# Patient Record
Sex: FEMALE | Race: WHITE | NOT HISPANIC OR LATINO | Employment: FULL TIME | URBAN - METROPOLITAN AREA
[De-identification: names, ages, dates, MRNs, and addresses within clinical notes are randomized per-mention and may not be internally consistent; named-entity substitution may affect disease eponyms.]

---

## 2024-03-22 ENCOUNTER — HOSPITAL ENCOUNTER (EMERGENCY)
Facility: HOSPITAL | Age: 34
Discharge: HOME | End: 2024-03-22
Attending: STUDENT IN AN ORGANIZED HEALTH CARE EDUCATION/TRAINING PROGRAM

## 2024-03-22 ENCOUNTER — APPOINTMENT (OUTPATIENT)
Dept: RADIOLOGY | Facility: HOSPITAL | Age: 34
End: 2024-03-22

## 2024-03-22 ENCOUNTER — APPOINTMENT (OUTPATIENT)
Dept: CARDIOLOGY | Facility: HOSPITAL | Age: 34
End: 2024-03-22

## 2024-03-22 VITALS
HEART RATE: 84 BPM | WEIGHT: 155 LBS | DIASTOLIC BLOOD PRESSURE: 74 MMHG | HEIGHT: 63 IN | RESPIRATION RATE: 18 BRPM | OXYGEN SATURATION: 100 % | SYSTOLIC BLOOD PRESSURE: 134 MMHG | TEMPERATURE: 97.9 F | BODY MASS INDEX: 27.46 KG/M2

## 2024-03-22 DIAGNOSIS — R10.9 ABDOMINAL PAIN, UNSPECIFIED ABDOMINAL LOCATION: Primary | ICD-10-CM

## 2024-03-22 LAB
ALBUMIN SERPL BCP-MCNC: 4.2 G/DL (ref 3.4–5)
ALP SERPL-CCNC: 67 U/L (ref 33–110)
ALT SERPL W P-5'-P-CCNC: 28 U/L (ref 7–45)
ANION GAP SERPL CALC-SCNC: 14 MMOL/L (ref 10–20)
APPEARANCE UR: ABNORMAL
AST SERPL W P-5'-P-CCNC: 34 U/L (ref 9–39)
BASOPHILS # BLD AUTO: 0.03 X10*3/UL (ref 0–0.1)
BASOPHILS NFR BLD AUTO: 0.4 %
BILIRUB SERPL-MCNC: 0.5 MG/DL (ref 0–1.2)
BILIRUB UR STRIP.AUTO-MCNC: NEGATIVE MG/DL
BUN SERPL-MCNC: 8 MG/DL (ref 6–23)
CALCIUM SERPL-MCNC: 9.2 MG/DL (ref 8.6–10.3)
CARDIAC TROPONIN I PNL SERPL HS: <3 NG/L (ref 0–13)
CHLORIDE SERPL-SCNC: 106 MMOL/L (ref 98–107)
CO2 SERPL-SCNC: 21 MMOL/L (ref 21–32)
COLOR UR: YELLOW
CREAT SERPL-MCNC: 0.46 MG/DL (ref 0.5–1.05)
EGFRCR SERPLBLD CKD-EPI 2021: >90 ML/MIN/1.73M*2
EOSINOPHIL # BLD AUTO: 0.11 X10*3/UL (ref 0–0.7)
EOSINOPHIL NFR BLD AUTO: 1.5 %
ERYTHROCYTE [DISTWIDTH] IN BLOOD BY AUTOMATED COUNT: 14.1 % (ref 11.5–14.5)
GLUCOSE SERPL-MCNC: 122 MG/DL (ref 74–99)
GLUCOSE UR STRIP.AUTO-MCNC: NEGATIVE MG/DL
HCG UR QL IA.RAPID: NEGATIVE
HCT VFR BLD AUTO: 39.7 % (ref 36–46)
HGB BLD-MCNC: 12.7 G/DL (ref 12–16)
HOLD SPECIMEN: NORMAL
IMM GRANULOCYTES # BLD AUTO: 0.03 X10*3/UL (ref 0–0.7)
IMM GRANULOCYTES NFR BLD AUTO: 0.4 % (ref 0–0.9)
KETONES UR STRIP.AUTO-MCNC: NEGATIVE MG/DL
LEUKOCYTE ESTERASE UR QL STRIP.AUTO: NEGATIVE
LIPASE SERPL-CCNC: 15 U/L (ref 9–82)
LYMPHOCYTES # BLD AUTO: 2.34 X10*3/UL (ref 1.2–4.8)
LYMPHOCYTES NFR BLD AUTO: 31.2 %
MCH RBC QN AUTO: 24.2 PG (ref 26–34)
MCHC RBC AUTO-ENTMCNC: 32 G/DL (ref 32–36)
MCV RBC AUTO: 76 FL (ref 80–100)
MONOCYTES # BLD AUTO: 0.75 X10*3/UL (ref 0.1–1)
MONOCYTES NFR BLD AUTO: 10 %
NEUTROPHILS # BLD AUTO: 4.25 X10*3/UL (ref 1.2–7.7)
NEUTROPHILS NFR BLD AUTO: 56.5 %
NITRITE UR QL STRIP.AUTO: NEGATIVE
NRBC BLD-RTO: 0 /100 WBCS (ref 0–0)
PH UR STRIP.AUTO: 5 [PH]
PLATELET # BLD AUTO: 264 X10*3/UL (ref 150–450)
POTASSIUM SERPL-SCNC: 3.4 MMOL/L (ref 3.5–5.3)
PROT SERPL-MCNC: 7.4 G/DL (ref 6.4–8.2)
PROT UR STRIP.AUTO-MCNC: NEGATIVE MG/DL
RBC # BLD AUTO: 5.25 X10*6/UL (ref 4–5.2)
RBC # UR STRIP.AUTO: NEGATIVE /UL
SODIUM SERPL-SCNC: 138 MMOL/L (ref 136–145)
SP GR UR STRIP.AUTO: 1.03
UROBILINOGEN UR STRIP.AUTO-MCNC: 2 MG/DL
WBC # BLD AUTO: 7.5 X10*3/UL (ref 4.4–11.3)

## 2024-03-22 PROCEDURE — 74177 CT ABD & PELVIS W/CONTRAST: CPT

## 2024-03-22 PROCEDURE — 76705 ECHO EXAM OF ABDOMEN: CPT

## 2024-03-22 PROCEDURE — 81025 URINE PREGNANCY TEST: CPT | Performed by: NURSE PRACTITIONER

## 2024-03-22 PROCEDURE — 93005 ELECTROCARDIOGRAM TRACING: CPT

## 2024-03-22 PROCEDURE — 85025 COMPLETE CBC W/AUTO DIFF WBC: CPT | Performed by: NURSE PRACTITIONER

## 2024-03-22 PROCEDURE — 96374 THER/PROPH/DIAG INJ IV PUSH: CPT

## 2024-03-22 PROCEDURE — 99285 EMERGENCY DEPT VISIT HI MDM: CPT | Mod: 25

## 2024-03-22 PROCEDURE — 84075 ASSAY ALKALINE PHOSPHATASE: CPT | Performed by: NURSE PRACTITIONER

## 2024-03-22 PROCEDURE — 84484 ASSAY OF TROPONIN QUANT: CPT | Performed by: NURSE PRACTITIONER

## 2024-03-22 PROCEDURE — 96375 TX/PRO/DX INJ NEW DRUG ADDON: CPT

## 2024-03-22 PROCEDURE — 74177 CT ABD & PELVIS W/CONTRAST: CPT | Performed by: STUDENT IN AN ORGANIZED HEALTH CARE EDUCATION/TRAINING PROGRAM

## 2024-03-22 PROCEDURE — 83690 ASSAY OF LIPASE: CPT | Performed by: NURSE PRACTITIONER

## 2024-03-22 PROCEDURE — 2550000001 HC RX 255 CONTRASTS: Performed by: NURSE PRACTITIONER

## 2024-03-22 PROCEDURE — 36415 COLL VENOUS BLD VENIPUNCTURE: CPT | Performed by: NURSE PRACTITIONER

## 2024-03-22 PROCEDURE — 96361 HYDRATE IV INFUSION ADD-ON: CPT

## 2024-03-22 PROCEDURE — 81003 URINALYSIS AUTO W/O SCOPE: CPT | Performed by: NURSE PRACTITIONER

## 2024-03-22 PROCEDURE — 2500000004 HC RX 250 GENERAL PHARMACY W/ HCPCS (ALT 636 FOR OP/ED): Performed by: NURSE PRACTITIONER

## 2024-03-22 PROCEDURE — 76705 ECHO EXAM OF ABDOMEN: CPT | Performed by: STUDENT IN AN ORGANIZED HEALTH CARE EDUCATION/TRAINING PROGRAM

## 2024-03-22 RX ORDER — TRAMADOL HYDROCHLORIDE 50 MG/1
50 TABLET ORAL EVERY 6 HOURS PRN
Qty: 12 TABLET | Refills: 0 | Status: SHIPPED | OUTPATIENT
Start: 2024-03-22 | End: 2024-03-25

## 2024-03-22 RX ORDER — ONDANSETRON HYDROCHLORIDE 2 MG/ML
4 INJECTION, SOLUTION INTRAVENOUS ONCE
Status: COMPLETED | OUTPATIENT
Start: 2024-03-22 | End: 2024-03-22

## 2024-03-22 RX ORDER — SODIUM CHLORIDE 9 MG/ML
125 INJECTION, SOLUTION INTRAVENOUS CONTINUOUS
Status: DISCONTINUED | OUTPATIENT
Start: 2024-03-22 | End: 2024-03-22 | Stop reason: HOSPADM

## 2024-03-22 RX ORDER — KETOROLAC TROMETHAMINE 30 MG/ML
15 INJECTION, SOLUTION INTRAMUSCULAR; INTRAVENOUS ONCE
Status: COMPLETED | OUTPATIENT
Start: 2024-03-22 | End: 2024-03-22

## 2024-03-22 RX ADMIN — SODIUM CHLORIDE 125 ML/HR: 9 INJECTION, SOLUTION INTRAVENOUS at 12:57

## 2024-03-22 RX ADMIN — ONDANSETRON 4 MG: 2 INJECTION INTRAMUSCULAR; INTRAVENOUS at 10:21

## 2024-03-22 RX ADMIN — IOHEXOL 75 ML: 350 INJECTION, SOLUTION INTRAVENOUS at 13:23

## 2024-03-22 RX ADMIN — KETOROLAC TROMETHAMINE 15 MG: 30 INJECTION, SOLUTION INTRAMUSCULAR at 10:20

## 2024-03-22 RX ADMIN — SODIUM CHLORIDE 1000 ML: 9 INJECTION, SOLUTION INTRAVENOUS at 10:20

## 2024-03-22 ASSESSMENT — PAIN - FUNCTIONAL ASSESSMENT
PAIN_FUNCTIONAL_ASSESSMENT: 0-10
PAIN_FUNCTIONAL_ASSESSMENT: 0-10

## 2024-03-22 ASSESSMENT — PAIN DESCRIPTION - LOCATION: LOCATION: ABDOMEN

## 2024-03-22 ASSESSMENT — COLUMBIA-SUICIDE SEVERITY RATING SCALE - C-SSRS
1. IN THE PAST MONTH, HAVE YOU WISHED YOU WERE DEAD OR WISHED YOU COULD GO TO SLEEP AND NOT WAKE UP?: NO
2. HAVE YOU ACTUALLY HAD ANY THOUGHTS OF KILLING YOURSELF?: NO
6. HAVE YOU EVER DONE ANYTHING, STARTED TO DO ANYTHING, OR PREPARED TO DO ANYTHING TO END YOUR LIFE?: NO

## 2024-03-22 ASSESSMENT — LIFESTYLE VARIABLES
HAVE PEOPLE ANNOYED YOU BY CRITICIZING YOUR DRINKING: NO
HAVE YOU EVER FELT YOU SHOULD CUT DOWN ON YOUR DRINKING: NO
TOTAL SCORE: 0
EVER HAD A DRINK FIRST THING IN THE MORNING TO STEADY YOUR NERVES TO GET RID OF A HANGOVER: NO
EVER FELT BAD OR GUILTY ABOUT YOUR DRINKING: NO

## 2024-03-22 ASSESSMENT — PAIN DESCRIPTION - PAIN TYPE: TYPE: ACUTE PAIN

## 2024-03-22 ASSESSMENT — PAIN SCALES - GENERAL
PAINLEVEL_OUTOF10: 6
PAINLEVEL_OUTOF10: 5 - MODERATE PAIN

## 2024-03-22 ASSESSMENT — PAIN DESCRIPTION - ORIENTATION: ORIENTATION: MID

## 2024-03-22 ASSESSMENT — PAIN DESCRIPTION - PROGRESSION: CLINICAL_PROGRESSION: OTHER (COMMENT)

## 2024-03-22 NOTE — Clinical Note
Oz Zapien was seen and treated in our emergency department on 3/22/2024.  She may return to work on 03/25/2024.       If you have any questions or concerns, please don't hesitate to call.      Cathleen Zamudio, APRN-CNP

## 2024-03-22 NOTE — CONSULTS
"Reason For Consult  Appendiceal mucocele     History Of Present Illness  Oz Zapien is a 33 y.o. female with PMH significant for GERD who presented to [facility] ED 3/22 for abdominal pain. Patient reports bilateral upper quadrant abdominal pain has been going on for quite some time but has become more bothersome over the past 4-5 days. Denies associated nausea or vomiting. Denies bowel complaints.     In the ED, patient hemodynamically stable, afebrile. Labs unremarkable. RUQ US significant for hepatomegaly and steatosis. CT a/p significant for a probably appendiceal mucocele at the tip of the appendix. Surgery consulted for further recommendations.      Past Medical History  She has a past medical history of Other specified health status.    Surgical History  She has a past surgical history that includes Other surgical history (01/06/2015).     Social History  She reports that she has never smoked. She has never used smokeless tobacco. She reports that she does not currently use alcohol. She reports that she does not currently use drugs.    Family History  No family history on file.     Allergies  Patient has no known allergies.       Physical Exam  Gen: NAD  CV: RRR  Pulm: Comfortable work of breathing on RA  Abd: Soft, nondistended, no guarding, no rigidity  Ext: Acyanotic, peripheral pulses palpable  Neuro: Cranial nerves II-XII grossly intact, no obvious focal deficits. Gag reflex, taste and corneal reflex not tested  Psych: A&O x 3, mood appropriate  Skin: warm, dry, intact       Last Recorded Vitals  Blood pressure 134/74, pulse 84, temperature 36.6 °C (97.9 °F), temperature source Tympanic, resp. rate 18, height 1.6 m (5' 3\"), weight 70.3 kg (155 lb), SpO2 100 %.    Relevant Results  Results for orders placed or performed during the hospital encounter of 03/22/24 (from the past 24 hour(s))   CBC and Auto Differential   Result Value Ref Range    WBC 7.5 4.4 - 11.3 x10*3/uL    nRBC 0.0 0.0 - 0.0 /100 WBCs    " RBC 5.25 (H) 4.00 - 5.20 x10*6/uL    Hemoglobin 12.7 12.0 - 16.0 g/dL    Hematocrit 39.7 36.0 - 46.0 %    MCV 76 (L) 80 - 100 fL    MCH 24.2 (L) 26.0 - 34.0 pg    MCHC 32.0 32.0 - 36.0 g/dL    RDW 14.1 11.5 - 14.5 %    Platelets 264 150 - 450 x10*3/uL    Neutrophils % 56.5 40.0 - 80.0 %    Immature Granulocytes %, Automated 0.4 0.0 - 0.9 %    Lymphocytes % 31.2 13.0 - 44.0 %    Monocytes % 10.0 2.0 - 10.0 %    Eosinophils % 1.5 0.0 - 6.0 %    Basophils % 0.4 0.0 - 2.0 %    Neutrophils Absolute 4.25 1.20 - 7.70 x10*3/uL    Immature Granulocytes Absolute, Automated 0.03 0.00 - 0.70 x10*3/uL    Lymphocytes Absolute 2.34 1.20 - 4.80 x10*3/uL    Monocytes Absolute 0.75 0.10 - 1.00 x10*3/uL    Eosinophils Absolute 0.11 0.00 - 0.70 x10*3/uL    Basophils Absolute 0.03 0.00 - 0.10 x10*3/uL   Comprehensive metabolic panel   Result Value Ref Range    Glucose 122 (H) 74 - 99 mg/dL    Sodium 138 136 - 145 mmol/L    Potassium 3.4 (L) 3.5 - 5.3 mmol/L    Chloride 106 98 - 107 mmol/L    Bicarbonate 21 21 - 32 mmol/L    Anion Gap 14 10 - 20 mmol/L    Urea Nitrogen 8 6 - 23 mg/dL    Creatinine 0.46 (L) 0.50 - 1.05 mg/dL    eGFR >90 >60 mL/min/1.73m*2    Calcium 9.2 8.6 - 10.3 mg/dL    Albumin 4.2 3.4 - 5.0 g/dL    Alkaline Phosphatase 67 33 - 110 U/L    Total Protein 7.4 6.4 - 8.2 g/dL    AST 34 9 - 39 U/L    Bilirubin, Total 0.5 0.0 - 1.2 mg/dL    ALT 28 7 - 45 U/L   Lipase   Result Value Ref Range    Lipase 15 9 - 82 U/L   Troponin I, High Sensitivity   Result Value Ref Range    Troponin I, High Sensitivity <3 0 - 13 ng/L   Urinalysis with Reflex Culture and Microscopic   Result Value Ref Range    Color, Urine Yellow Straw, Yellow    Appearance, Urine Hazy (N) Clear    Specific Gravity, Urine 1.031 1.005 - 1.035    pH, Urine 5.0 5.0, 5.5, 6.0, 6.5, 7.0, 7.5, 8.0    Protein, Urine NEGATIVE NEGATIVE mg/dL    Glucose, Urine NEGATIVE NEGATIVE mg/dL    Blood, Urine NEGATIVE NEGATIVE    Ketones, Urine NEGATIVE NEGATIVE mg/dL     Bilirubin, Urine NEGATIVE NEGATIVE    Urobilinogen, Urine 2.0 (N) <2.0 mg/dL    Nitrite, Urine NEGATIVE NEGATIVE    Leukocyte Esterase, Urine NEGATIVE NEGATIVE   hCG, Urine, Qualitative   Result Value Ref Range    HCG, Urine NEGATIVE NEGATIVE     CT abdomen pelvis w IV contrast    Result Date: 3/22/2024  Interpreted By:  Alfredo Rivas, STUDY: CT ABDOMEN PELVIS W IV CONTRAST;  3/22/2024 1:23 pm   INDICATION: Upper abdominal pain x 4 to 5 days, radiating through to the back with nausea, fever and chills.   COMPARISON: None   ACCESSION NUMBER(S): [id number]   ORDERING CLINICIAN: SONIA MILLER   TECHNIQUE: Axial CT of the abdomen and pelvis was performed following intravenous administration of 75 ml of contrast Omnipaque 350 with coronal and sagittal reconstruction.   FINDINGS: Lower chest: No focal consolidation or pleural effusion.   Liver: No mass.   Biliary: No intrahepatic or extrahepatic bile duct dilation. No cholelithiasis.   Spleen: No mass.   Pancreas: No mass or duct dilation.   Adrenals: Normal.   Kidneys: No suspicious mass, calculus or hydronephrosis.   GI tract: No bowel wall thickening or dilation. The tip of the appendix is dilated up to 1.1 cm with curvilinear mural calcification (series 602, image 51). The remainder of the appendix is normal in caliber without periappendiceal inflammation.   Lymph nodes: No abdominopelvic lymphadenopathy.   Mesentery/peritoneum: No free fluid, free air or fluid collection.   Vasculature: No abdominal aortic aneurysm.   Pelvis: Trace physiologic free fluid in the posterior cul-de-sac. No free air or fluid collection. Age-appropriate anteverted uterus.   Bones/Soft tissues: No acute osseous or abdominal wall soft tissue abnormalities.       No renal calculus, hydronephrosis or other acute abdominopelvic process.   Suspect probable appendiceal mucocele at the tip of the appendix; consider surgical consultation. No acute appendicitis.   MACRO: Alfredo Rivas discussed  the significance and urgency of this critical finding by Epic secure chat with  SONIA MILLER on 3/22/2024 at 1:48 pm.  (**-RCF-**) Findings:  See findings.   Signed by: Alfredo Rivas 3/22/2024 1:48 PM Dictation workstation:   [id number]    US gallbladder    Result Date: 3/22/2024  Interpreted By:  Juan Conway, STUDY: US GALLBLADDER  3/22/2024 11:45 am   INDICATION: 34 y/o   F with  Signs/Symptoms:Upper abdominal pain, rule out gallbladder issue.   COMPARISON: None.   ACCESSION NUMBER(S): [id number]   ORDERING CLINICIAN: SONIA MILLER   TECHNIQUE: Routine ultrasound of the right upper quadrant was performed.  Static images were obtained for remote interpretation.   FINDINGS: LIVER: Craniocaudal length:  18.1 cm,  enlarged Echogenicity:  Increased Mass:  None.   BILE DUCTS: Intrahepatic ducts: Non-dilated. Common bile duct diameter: 4 mm.   GALLBLADDER: Gallbladder:  Normal. Gallstones:  None. Gallbladder sludge:  None. Gallbladder wall thickening:  None. Pericholecystic fluid:  None.   PANCREAS:   Visualized portions are unremarkable.   RIGHT KIDNEY: Craniocaudal length:  11.6 cm,  within normal limits of size for age. No hydronephrosis, hydroureter or focal renal lesion.   PERITONEAL FLUID:   None seen in the right upper quadrant.       Hepatomegaly and steatosis. No focal lesion. No cholelithiasis or ductal dilation.   Signed by: Juan Conway 3/22/2024 11:52 AM Dictation workstation:   [id number]        Assessment/Plan     Oz Zapien is a 33 y.o. female with PMH significant for GERD who presented to [facility] ED 3/22 for abdominal pain. RUQ US significant for hepatomegaly and steatosis. CT a/p significant for a probably appendiceal mucocele at the tip of the appendix. Surgery consulted for further recommendations.     Impression:  Appendiceal mucocele    Recommendations:  - patient will need to follow up with Dr. Domingo in the next 2 weeks to discuss elective lap appendectomy  - no need for  admission from surgical standpoint  - no need for homegoing antibiotics  - remainder of care per ED    Patient seen and discussed with Dr. Domingo    I spent 30 minutes in the professional and overall care of this patient.      Geri Angeles, PRAKASH-CNP

## 2024-03-22 NOTE — ED PROVIDER NOTES
Limitations to History: None     HPI:      Oz Zapien is a 33 y.o. female with significant past medical history for GERD on omeprazole and no abdominal surgeries presenting to ED today from home with  for evaluation of abdominal pain.  For the last 4 to 5 days the patient has had upper abdominal pain that is described as a sharp aching sensation that radiates through to the back.  Occasionally the patient feels a knifelike pain in the epigastric region.  Has had GI issues since she delivered her daughter 9 months ago, it was thought to be some irritable bowel syndrome.  Endorses nausea.  Has had nonbloody loose stools for some time up to 3 a day.  Reports fever and chills earlier in the week that have resolved.  Denies cough/cold symptoms, chest pain, shortness of breath, vomiting, dysuria/hematuria or any other complaints.  No smoking, EtOH or drug use.  N.p.o. since 6 AM.  PCP at [facility].    Additional History Obtained from: None    ------------------------------------------------------------------------------------------------------------------------------------------    VS: As documented in the triage note and EMR flowsheet from this visit were reviewed.    Physical Exam:  Gen:  33-year-old female appears uncomfortable but nontoxic.  Awake, alert and oriented x 3.  Pleasant and cooperative.  Well-nourished and hydrated.    Head and neck: Normocephalic and atraumatic.  No anterior cervical lymphadenopathy.  Full range of motion neck without difficulty.  Eyes: EOMI, PERRL, anicteric sclerae, noninjected conjunctivae  Ears: TMs clear b/l without sign of infection  Nose: Nares patent w/o rhinorrhea  Mouth:  MMM, no OP lesions noted  Heart: RRR no MRG  Lungs: CTA b/l no RRW, no increased work of breathing  Abdomen: Abdomen slightly rounded, tender in the epigastric and right upper quadrant.  Bowel sounds x 4.  No rebound or guarding.  No palpable organomegaly.  No CVA tenderness.  Musculoskeletal: Movement of  extremities x 4.  MSPs intact.  Skin intact.  No deformities.  Neurologic: Alert, symmetrical facies, phonates clearly, moves all extremities equally, responsive to touch, ambulates normally   Skin: Pink warm dry, no erythema, ecchymosis or edema.  No rashes noted  Psychological: calm, no SI/HI      ------------------------------------------------------------------------------------------------------------------------------------------    Medical Decision Making:     ED Course as of 03/22/24 1515   Fri Mar 22, 2024   1110 Laboratory studies were reviewed, no leukocytosis or evidence of anemia.  Normal kidney function, LFTs and lipase.  Normal high-sensitivity troponin.  Potassium mildly low at 3.4.  Urine, CT and ultrasound pending. [SB]   1400 Urine shows no evidence of infection.  Right upper quadrant ultrasound shows hepatomegaly and steatosis, gallbladder within normal limits.  CT abdomen/pelvis suspect probable appendiceal mucocele at the tip of the appendix; consider surgical consultation.  Dr. Domingo contacted via secure chat.  Suggest follow-up in the office.  Surgical DARON will see the patient at the bedside.  Patient feels improved and is agreeable to going home and following up in the office. [SB]   1505 Remains hemodynamically stable here in the emergency department.  Surgery DARON Donita at the bedside to evaluate the patient.  Patient will be discharged home and follow-up in the office with Dr Domingo in the next couple weeks to schedule elective appendectomy.  Discharge prescriptions include a 3-day supply of tramadol for severe pain sent to the patient's pharmacy.  OARRS report is clear.  Tylenol/Motrin for moderate pain.  Off work today, may return on next regular scheduled day.  Increase fluids.  Rest and monitor symptoms.  Return precautions discussed.  Diagnosis, treatment and plan discussed with patient, she verbalizes understanding and is in agreement. [SB]      ED Course User Index  [SB]  Cathleen Zamudio, APRN-TRESSA         Diagnoses as of 03/22/24 1515   Abdominal pain, unspecified abdominal location       EKG interpreted by Dr. Barnhart at 9:45 AM, sinus rhythm at a rate of 84, borderline T wave abnormalities, normal axis, no ST segment depression or elevation consistent with ischemia or infarction.    Chronic Medical Conditions Significantly Affecting Care: None    External Records Reviewed: I reviewed recent and relevant outside records including: None    Discussion of Management with Other Providers: Seen and evaluated with ED attending physician, Dr. Hager, he agrees with the treatment plan and final disposition of the patient.    I discussed the patient/results with:  and surgery DARON Geri Zamudio, PRAKASH-TRESSA  03/22/24 1510

## 2024-03-22 NOTE — ED TRIAGE NOTES
Pt presents to ED c/o upper abdominal pain that radiates to center of back x4 days. Pt states pain occurs intermittently as a sharp pain. Pt reports nausea and indigestion. Pt denies CP, SOB, vomiting, diarrhea. Pt has lingering cough from recent illness.

## 2024-03-22 NOTE — DISCHARGE INSTRUCTIONS
3-day supply tramadol for severe pain was sent to your pharmacy.  Tylenol/Motrin for moderate pain.  Follow-up in the office with surgery in the next 1 to 2 weeks to schedule elective appendectomy.

## 2024-03-26 LAB
ATRIAL RATE: 85 BPM
P AXIS: 76 DEGREES
PR INTERVAL: 135 MS
Q ONSET: 249 MS
QRS COUNT: 14 BEATS
QRS DURATION: 82 MS
QT INTERVAL: 346 MS
QTC CALCULATION(BAZETT): 409 MS
QTC FREDERICIA: 386 MS
R AXIS: 78 DEGREES
T AXIS: 12 DEGREES
T OFFSET: 422 MS
VENTRICULAR RATE: 84 BPM